# Patient Record
Sex: FEMALE | Race: AMERICAN INDIAN OR ALASKA NATIVE
[De-identification: names, ages, dates, MRNs, and addresses within clinical notes are randomized per-mention and may not be internally consistent; named-entity substitution may affect disease eponyms.]

---

## 2018-02-04 ENCOUNTER — HOSPITAL ENCOUNTER (EMERGENCY)
Dept: HOSPITAL 5 - ED | Age: 23
LOS: 1 days | Discharge: HOME | End: 2018-02-05
Payer: SELF-PAY

## 2018-02-04 VITALS — DIASTOLIC BLOOD PRESSURE: 66 MMHG | SYSTOLIC BLOOD PRESSURE: 128 MMHG

## 2018-02-04 DIAGNOSIS — M54.5: Primary | ICD-10-CM

## 2018-02-04 LAB
BACTERIA #/AREA URNS HPF: (no result) /HPF
BILIRUB UR QL STRIP: (no result)
BLOOD UR QL VISUAL: (no result)
NITRITE UR QL STRIP: (no result)
PH UR STRIP: 7 [PH] (ref 5–7)
RBC #/AREA URNS HPF: > 182 /HPF (ref 0–6)
UROBILINOGEN UR-MCNC: 2 MG/DL (ref ?–2)
WBC #/AREA URNS HPF: 18 /HPF (ref 0–6)

## 2018-02-04 PROCEDURE — 81025 URINE PREGNANCY TEST: CPT

## 2018-02-04 PROCEDURE — 96372 THER/PROPH/DIAG INJ SC/IM: CPT

## 2018-02-04 PROCEDURE — 81001 URINALYSIS AUTO W/SCOPE: CPT

## 2018-02-04 PROCEDURE — 99283 EMERGENCY DEPT VISIT LOW MDM: CPT

## 2018-02-04 PROCEDURE — 87086 URINE CULTURE/COLONY COUNT: CPT

## 2018-02-05 NOTE — EMERGENCY DEPARTMENT REPORT
ED Female  HPI





- General


Chief complaint: Back Pain/Injury


Stated complaint: ABDOMINAL,BACK PAIN


Time Seen by Provider: 18 02:24


Source: patient


Mode of arrival: Ambulatory


Limitations: No Limitations





- History of Present Illness


Initial comments: 





Patient reports that she is having lower back pain on both sides and nausea 

onset after starting Macrodantin and Flagyl and Tylenol No. 3.  Patient says 

she was treated at [facility] for bacterial vaginosis and urinary 

tract infection she said she started her period today.  She states she is 

taking the medication because they told her that she has a urinary tract 

infection but the pain is getting worse.  Denies any burning on urination.  She 

does report frequency and urgency.  Denies any fever or chills.  She says she 

had nausea started last week on and off without any vomiting.  Denies any 

abdominal pain or vaginal discharge.  Pain to lower back as 9 out of 10 and 

aching in and she said they gave her Tylenol 3 which has been helping but she 

does not have any at present.  She says she was having low-grade fever but she 

said some Motrin today and it's better.  She placed on triage sheet that she 

had her last period 2018 but her period is very irregular and it started 

today.  Patient says she went to see her doctor a few days ago.


MD Complaint: other (back pain and nausea)


Onset/Timin


-: week(s)


Location: other (lower back)


Radiation: non-radiating


Severity: severe


Severity scale (0 -10): 9


Quality: aching


Improves with: none


Worsens with: none


Are you Pregnant Now?: No (started today)


Associated Symptoms: vaginal bleeding (started her menses today), nausea/

vomiting, fever/chills, loss of appetite.  denies: vaginal discharge, abdominal 

pain, headaches, dysuria, hematuria, rash, seizure, shortness of breath, syncope

, weakness





- Related Data


Sexually active: Yes


 Previous Rx's











 Medication  Instructions  Recorded  Last Taken  Type


 


Ibuprofen [Motrin] 600 mg PO Q8H PRN #12 tablet 18 Unknown Rx


 


Ondansetron [Zofran Odt] 4 mg PO Q6H PRN #16 tab.rapdis 18 Unknown Rx


 


Sulfamethoxazole/Trimethoprim 1 each PO BID 10 Days #20 tablet 18 Unknown 

Rx





[Bactrim DS TAB]    











 Allergies











Allergy/AdvReac Type Severity Reaction Status Date / Time


 


No Known Allergies Allergy   Verified 18 23:24














ED Review of Systems


ROS: 


Stated complaint: ABDOMINAL,BACK PAIN


Other details as noted in HPI





Comment: All other systems reviewed and negative


Constitutional: chills, fever


Eyes: denies: eye pain, eye discharge


ENT: denies: ear pain, throat pain, congestion


Respiratory: no symptoms reported


Cardiovascular: denies: chest pain, palpitations, dyspnea on exertion, edema, 

syncope, paroxysmal nocturnal dyspnea


Gastrointestinal: nausea.  denies: abdominal pain, vomiting, diarrhea, 

constipation, hematemesis, melena, hematochezia


Genitourinary: urgency, frequency, abnormal menses.  denies: dysuria, hematuria

, discharge, dyspareunia


Musculoskeletal: back pain.  denies: joint swelling, arthralgia, myalgia


Skin: denies: rash


Neurological: denies: headache, numbness, paresthesias, confusion, abnormal gait

, vertigo





ED Past Medical Hx





- Past Medical History


Previous Medical History?: No


Additional medical history: Urinary tract infection





- Surgical History


Past Surgical History?: No





- Family History


Family history: no significant





- Social History


Smoking Status: Never Smoker


Substance Use Type: None





- Medications


Home Medications: 


 Home Medications











 Medication  Instructions  Recorded  Confirmed  Last Taken  Type


 


Ibuprofen [Motrin] 600 mg PO Q8H PRN #12 tablet 18  Unknown Rx


 


Ondansetron [Zofran Odt] 4 mg PO Q6H PRN #16 tab.rapdis 18  Unknown Rx


 


Sulfamethoxazole/Trimethoprim 1 each PO BID 10 Days #20 tablet 18  

Unknown Rx





[Bactrim DS TAB]     














ED Physical Exam





- General


Limitations: No Limitations


General appearance: alert, in no apparent distress





- Head


Head exam: Present: atraumatic, normocephalic, normal inspection





- Eye


Eye exam: Present: normal appearance, PERRL, EOMI.  Absent: periorbital swelling

, periorbital tenderness


Pupils: Present: normal accommodation





- ENT


ENT exam: Present: normal exam, normal orophraynx, mucous membranes moist, TM's 

normal bilaterally, normal external ear exam





- Neck


Neck exam: Present: normal inspection, full ROM, other (no C-spine tenderness).

  Absent: tenderness, meningismus, lymphadenopathy, thyromegaly





- Respiratory


Respiratory exam: Present: normal lung sounds bilaterally.  Absent: respiratory 

distress, chest wall tenderness, accessory muscle use





- Cardiovascular


Cardiovascular Exam: Present: normal rhythm, tachycardia, normal heart sounds.  

Absent: systolic murmur, diastolic murmur





- GI/Abdominal


GI/Abdominal exam: Present: soft, normal bowel sounds.  Absent: distended, 

tenderness, guarding, rebound, rigid, organomegaly, mass, bruit, pulsatile mass

, hernia





- Extremities Exam


Extremities exam: Present: normal inspection, full ROM, normal capillary refill

, other (no clubbing, cyanosis or edema.  +2 pulses all extremities and no 

neurovascular compromise.  As 5 strength in all extremities).  Absent: 

tenderness, pedal edema, joint swelling, calf tenderness





- Back Exam


Back exam: Present: normal inspection, full ROM, other (ambulates without any 

difficulties).  Absent: tenderness, CVA tenderness (R), CVA tenderness (L), 

muscle spasm, paraspinal tenderness, vertebral tenderness, rash noted





- Expanded Back Exam


  ** Expanded


Back exam: Absent: saddle anesthesia


Back exam: Negative Straight Leg Raising: Left, Right





- Neurological Exam


Neurological exam: Present: alert, oriented X3, normal gait, reflexes normal, 

other (no gross focal neurological deficit).  Absent: motor sensory deficit





- Psychiatric


Psychiatric exam: Present: normal affect, normal mood





- Skin


Skin exam: Present: warm, dry, intact, normal color.  Absent: rash





ED Course


 Vital Signs











  18





  21:10 21:14 04:13


 


Temperature 98.4 F 98.4 F 98.7 F


 


Pulse Rate 119 H 116 H 99 H


 


Respiratory 18 18 16





Rate   


 


Blood Pressure 128/66 128/66 


 


O2 Sat by Pulse 100 100 98





Oximetry   














 Vital Signs











  18





  21:10 21:14 04:13


 


Temperature 98.4 F 98.4 F 98.7 F


 


Pulse Rate 119 H 116 H 99 H


 


Respiratory 18 18 16





Rate   


 


Blood Pressure 128/66 128/66 


 


O2 Sat by Pulse 100 100 98





Oximetry   














- Reevaluation(s)


Reevaluation #1: 





18 04:14


Patient given Rocephin 1 g IM and started on Bactrim DS one tablet by mouth in 

the emergency room.  She was given ibuprofen 800 mg at 2330 and hydrocodone 5/

7.5 mg in ED room and voiced relief of pain.  Patient also given Zofran 8 mg 

ODT and 4 mg intermittent and she has no nausea and able to tolerate oral 

liquids.  Patient drank one cup of water and 3 cups of juice without any nausea 

or vomiting























ED Medical Decision Making





- Lab Data








 Lab Results











  18 Range/Units





  Unknown 


 


Urine Color  Yellow  (Yellow)  


 


Urine Turbidity  Clear  (Clear)  


 


Urine pH  7.0  (5.0-7.0)  


 


Ur Specific Gravity  1.014  (1.003-1.030)  


 


Urine Protein  30 mg/dl  (Negative)  mg/dL


 


Urine Glucose (UA)  Neg  (Negative)  mg/dL


 


Urine Ketones  Neg  (Negative)  mg/dL


 


Urine Blood  Lg  (Negative)  


 


Urine Nitrite  Neg  (Negative)  


 


Urine Bilirubin  Neg  (Negative)  


 


Urine Urobilinogen  2.0  (<2.0)  mg/dL


 


Ur Leukocyte Esterase  Tr  (Negative)  


 


Urine WBC (Auto)  18.0 H  (0.0-6.0)  /HPF


 


Urine RBC (Auto)  > 182.0  (0.0-6.0)  /HPF


 


U Epithel Cells (Auto)  2.0  (0-13.0)  /HPF


 


Urine Bacteria (Auto)  1+  (Negative)  /HPF


 


Urine HCG, Qual  Negative  (Negative)  








Urine culture pending





- Radiology Data


Radiology results: report reviewed





- Medical Decision Making





ED course: Patient here report lower back pain and urinary urgency and 

frequency.  She also reports that she is having some nausea.  Patient says she 

went to the Medical and was given Macrobid and Flagyl to treat urinary tract 

infection and bacterial vaginosis.  She says she started taking the medication 

and got sick and she took most of the medicine but she is having lower back 

pain and some urinary frequency urgency without any urinary burning.  Denies 

any fever or chills.  Her vital signs are stable she is afebrile at present.  

Patient was found to have urinary tract infection.  Pregnancy test negative and 

cultures sent and pending.  Patient was given Motrin 800 mg in the emergency 

room, she was later given Norco 7.5/325 mg 1 tablet and Zofran 4 mg ODT 2.  

She was orally hydrated in the emergency room.  Patient was given Rocephin 1 g 

IM and Bactrim DS one tablet by mouth for urinary tract infection.  I discussed 

patient diagnoses, treatment plan need to follow up and keep herself hydrated.  

Patient was undescended discharge instructions and treatment plan and 

discharged home in stable condition with prescription for Bactrim DS, Zofran 

and Motrin and to follow up with primary care physician.


Critical care attestation.: 


If time is entered above; I have spent that time in minutes in the direct care 

of this critically ill patient, excluding procedure time.








ED Disposition


Clinical Impression: 


 Acute cystitis with hematuria, Nausea alone





Lower back pain


Qualifiers:


 Chronicity: acute Back pain laterality: bilateral Sciatica presence: without 

sciatica Qualified Code(s): M54.5 - Low back pain





Disposition:  TO HOME OR SELFCARE


Is pt being admited?: No


Does the pt Need Aspirin: No


Condition: Stable


Instructions:  Urinary Tract Infection in Women (ED), Acute Nausea and Vomiting 

(ED), Back Pain (ED)


Additional Instructions: 


Please increase her fluid intake to 2-3 L of water, cranberry juice or apple 

juice daily


Take probiotic with antibiotic and this can help to prevent diarrhea


Please stop taking Macrobid if you have anymore and start taking an Bactrim DS


Please urinate after each sexual activity and do not hold urine when you get 

the urge to go


Diffuse symptoms worsen, you can return to the emergency room otherwise follow-

up with your primary care physician


Prescriptions: 


Ibuprofen [Motrin] 600 mg PO Q8H PRN #12 tablet


 PRN Reason: Pain


Ondansetron [Zofran Odt] 4 mg PO Q6H PRN #16 tab.rapdis


 PRN Reason: Nausea And Vomiting


Sulfamethoxazole/Trimethoprim [Bactrim DS TAB] 1 each PO BID 10 Days #20 tablet


Referrals: 


[facility] [Outside] - 2-3 Days


follow-up with your, primary care physician [Other] - 2-3 Days


Forms:  Accompanied Note, Work/School Release Form(ED)